# Patient Record
Sex: MALE | Race: WHITE | NOT HISPANIC OR LATINO | URBAN - METROPOLITAN AREA
[De-identification: names, ages, dates, MRNs, and addresses within clinical notes are randomized per-mention and may not be internally consistent; named-entity substitution may affect disease eponyms.]

---

## 2020-06-07 ENCOUNTER — EMERGENCY (EMERGENCY)
Facility: HOSPITAL | Age: 35
LOS: 0 days | Discharge: HOME | End: 2020-06-07
Attending: EMERGENCY MEDICINE | Admitting: EMERGENCY MEDICINE
Payer: COMMERCIAL

## 2020-06-07 VITALS
OXYGEN SATURATION: 97 % | HEART RATE: 99 BPM | TEMPERATURE: 97 F | RESPIRATION RATE: 18 BRPM | SYSTOLIC BLOOD PRESSURE: 176 MMHG | DIASTOLIC BLOOD PRESSURE: 106 MMHG

## 2020-06-07 DIAGNOSIS — N50.819 TESTICULAR PAIN, UNSPECIFIED: ICD-10-CM

## 2020-06-07 DIAGNOSIS — R59.0 LOCALIZED ENLARGED LYMPH NODES: ICD-10-CM

## 2020-06-07 DIAGNOSIS — N50.811 RIGHT TESTICULAR PAIN: ICD-10-CM

## 2020-06-07 DIAGNOSIS — I86.1 SCROTAL VARICES: ICD-10-CM

## 2020-06-07 DIAGNOSIS — N43.3 HYDROCELE, UNSPECIFIED: ICD-10-CM

## 2020-06-07 LAB
APPEARANCE UR: CLEAR — SIGNIFICANT CHANGE UP
BACTERIA # UR AUTO: NEGATIVE — SIGNIFICANT CHANGE UP
BILIRUB UR-MCNC: NEGATIVE — SIGNIFICANT CHANGE UP
COLOR SPEC: YELLOW — SIGNIFICANT CHANGE UP
DIFF PNL FLD: SIGNIFICANT CHANGE UP
EPI CELLS # UR: 2 /HPF — SIGNIFICANT CHANGE UP (ref 0–5)
GLUCOSE UR QL: NEGATIVE — SIGNIFICANT CHANGE UP
HYALINE CASTS # UR AUTO: 11 /LPF — HIGH (ref 0–7)
KETONES UR-MCNC: SIGNIFICANT CHANGE UP
LEUKOCYTE ESTERASE UR-ACNC: ABNORMAL
NITRITE UR-MCNC: NEGATIVE — SIGNIFICANT CHANGE UP
PH UR: 5.5 — SIGNIFICANT CHANGE UP (ref 5–8)
PROT UR-MCNC: ABNORMAL
RBC CASTS # UR COMP ASSIST: 2 /HPF — SIGNIFICANT CHANGE UP (ref 0–4)
SP GR SPEC: 1.03 — HIGH (ref 1.01–1.02)
UROBILINOGEN FLD QL: SIGNIFICANT CHANGE UP
WBC UR QL: 54 /HPF — HIGH (ref 0–5)

## 2020-06-07 PROCEDURE — 99284 EMERGENCY DEPT VISIT MOD MDM: CPT

## 2020-06-07 PROCEDURE — 76870 US EXAM SCROTUM: CPT | Mod: 26

## 2020-06-07 NOTE — ED PROVIDER NOTE - CARE PROVIDER_API CALL
Don Carver  Urology  [address] [place] [address] [address] [place] [id number]  Phone: [phone]  Fax: [phone]  Follow Up Time:

## 2020-06-07 NOTE — ED PROVIDER NOTE - PROGRESS NOTE DETAILS
Discussed results with pt, pt admits sexually active with wife, and offered STD medications, however, does not want them. culture sent off

## 2020-06-07 NOTE — ED PROVIDER NOTE - NS ED ROS FT
Review of Systems:  	•	CONSTITUTIONAL - no fever, no diaphoresis, no chills  	•	SKIN - no rash  	•	EYES - no eye pain, no blurry vision  	•	ENT - no change in hearing, no sore throat, no ear pain or tinnitus  	•	RESPIRATORY - no shortness of breath, no cough  	•	CARDIAC - no chest pain, no palpitations  	•	GI - no abd pain, no nausea, no vomiting, no diarrhea, no constipation  	•	GENITO-URINARY - + right testicular and groin pain  	•	MUSCULOSKELETAL - no joint paint, no swelling, no redness  	•	NEUROLOGIC - no weakness, no headache, no paresthesias, no LOC  	•	PSYCH - no anxiety, non suicidal, non homicidal, no hallucination, no depression

## 2020-06-07 NOTE — ED PROVIDER NOTE - NSFOLLOWUPINSTRUCTIONS_ED_ALL_ED_FT
Lymphadenopathy    Lymphadenopathy means that your lymph glands are swollen or larger than normal (enlarged). Lymph glands, also called lymph nodes, are collections of tissue that filter bacteria, viruses, and waste from your bloodstream. They are part of your body's disease-fighting system (immune system), which protects your body from germs.    There may be different causes of lymphadenopathy, depending on where it is in your body. Some types go away on their own. Lymphadenopathy can occur anywhere that you have lymph glands, including these areas:  Neck (cervical lymphadenopathy).  Chest (mediastinal lymphadenopathy).  Lungs (hilar lymphadenopathy).  Underarms (axillary lymphadenopathy).  Groin (inguinal lymphadenopathy).  When your immune system responds to germs, infection-fighting cells and fluid build up in your lymph glands. This causes some swelling and enlargement. If the lymph glands do not go back to normal after you have an infection or disease, your health care provider may do tests. These tests help to monitor your condition and find the reason why the glands are still swollen and enlarged.    Follow these instructions at home:  Get plenty of rest.  Take over-the-counter and prescription medicines only as told by your health care provider. Your health care provider may recommend over-the-counter medicines for pain.  If directed, apply heat to swollen lymph glands as often as told by your health care provider. Use the heat source that your health care provider recommends, such as a moist heat pack or a heating pad.  Place a towel between your skin and the heat source.   Leave the heat on for 20–30 minutes.   Remove the heat if your skin turns bright red. This is especially important if you are unable to feel pain, heat, or cold. You may have a greater risk of getting burned.  Check your affected lymph glands every day for changes. Check other lymph gland areas as told by your health care provider. Check for changes such as:  More swelling.  Sudden increase in size.  Redness or pain.  Hardness.  Keep all follow-up visits as told by your health care provider. This is important.  Contact a health care provider if you have:  Swelling that gets worse or spreads to other areas.  Problems with breathing.  Lymph glands that:  Are still swollen after 2 weeks.  Have suddenly gotten bigger.  Are red, painful, or hard.  A fever or chills.  Fatigue.  A sore throat.  Pain in your abdomen.  Weight loss.  Night sweats.  Get help right away if you have:  Fluid leaking from an enlarged lymph gland.  Severe pain.  Chest pain.  Shortness of breath.  Summary  Lymphadenopathy means that your lymph glands are swollen or larger than normal (enlarged).  Lymph glands (also called lymph nodes) are collections of tissue that filter bacteria, viruses, and waste from the bloodstream. They are part of your body's disease-fighting system (immune system).  Lymphadenopathy can occur anywhere that you have lymph glands.  If your enlarged and swollen lymph glands do not go back to normal after you have an infection or disease, your health care provider may do tests to monitor your condition and find the reason why the glands are still swollen and enlarged.  Check your affected lymph glands every day for changes. Check other lymph gland areas as told by your health care provider.  This information is not intended to replace advice given to you by your health care provider. Make sure you discuss any questions you have with your health care provider.      Hydrocele    WHAT YOU NEED TO KNOW:    A hydrocele is a collection of fluid inside the scrotum. The scrotum holds the testicles. Hydroceles are simple or communicating. A simple hydrocele stays the same size. A communicating hydrocele gets bigger and smaller as fluid flows into and out of the scrotum.    DISCHARGE INSTRUCTIONS:    Medicines:     Pain medicine:     You may need medicine to take away or decrease pain.   Learn how to take your medicine. Ask what medicine and how much you should take. Be sure you know how, when, and how often to take it.      Do not wait until the pain is severe before you take your medicine. Tell your healthcare provider if your pain does not decrease.      Pain medicine can make you dizzy or sleepy. Prevent falls by calling someone when you get out of bed or if you need help.      Take your medicine as directed. Contact your healthcare provider if you think your medicine is not helping or if you have side effects. Tell him of her if you are allergic to any medicine. Keep a list of the medicines, vitamins, and herbs you take. Include the amounts, and when and why you take them. Bring the list or the pill bottles to follow-up visits. Carry your medicine list with you in case of an emergency.    Follow up with your healthcare provider or urologist as directed: Write down your questions so you remember to ask them during your visits.     Support: You may need to wear a fabric support device similar to a jock strap to decrease swelling.    Contact your healthcare provider or urologist if:     The swelling gets worse or does not go away.      You have questions or concerns about your condition or care.    Return to the emergency department if:     You have severe pain in your scrotum.      You have a fever and your scrotum is red and swollen.        Varicocele    A varicocele is a swelling of veins in the scrotum. The scrotum is the sac that contains the testicles. Varicoceles can occur on either side of the scrotum, but they are more common on the left side. They occur most often in teenage boys and young men.    In most cases, varicoceles are not a serious problem. They are usually small and painless and do not require treatment. Tests may be done to confirm the diagnosis. Treatment may be needed if:    A varicocele is large, causes a lot of pain, or causes pain when exercising.  Varicoceles are found on both sides of the scrotum.  The testicle on the opposite side is absent or not normal.  A varicocele causes a decrease in the size of the testicle in a growing adolescent.  The person has fertility problems.     CAUSES  This condition is the result of valves in the veins not working properly. Valves in the veins help to return blood from the scrotum and testicles to the heart. If these valves do not work well, blood flows backward and backs up into the veins, which causes the veins to swell. This is similar to what happens when varicose veins form in the leg.    SYMPTOMS  Most varicoceles do not cause any symptoms. If symptoms do occur, they may include:    Swelling on one side of the scrotum. The swelling may be more obvious when you are standing up.  A lumpy feeling in the scrotum.  A heavy feeling on one side of the scrotum.  A dull ache in the scrotum, especially after exercise or prolonged standing or sitting.  Slower growth or reduced size of the testicle on the side of the varicocele (in young males).  Problems with fertility. These can occur if the testicle does not grow normally.    DIAGNOSIS  This condition may be diagnosed with a physical exam. You may also have an imaging test, called an ultrasound, to confirm the diagnosis and to help rule out other causes of the swelling.    TREATMENT  Treatment is usually not needed for this condition. If you have any pain, your health care provider may prescribe or recommend medicine to help relieve it. You may need regular exams so your health care provider can monitor the varicocele to ensure that it does not cause problems. When further treatment is needed, it may involve one of these options:    Varicocelectomy. This is a surgery in which the swollen veins are tied off so that the flow of blood goes to other veins instead.  Embolization. In this procedure, a small tube (catheter) is used to place metal coils or other blocking items in the veins. This cuts off the blood flow to the swollen veins.    HOME CARE INSTRUCTIONS  Take medicines only as directed by your health care provider.  Wear supportive underwear.  Use an athletic supporter for sports.  Keep all follow-up visits as directed by your health care provider. This is important.    SEEK MEDICAL CARE IF:  Your pain is increasing.  You have redness in the affected area.  You have swelling that does not decrease when you are lying down.  One of your testicles is smaller than the other.  Your testicle becomes enlarged, swollen, or painful.    ADDITIONAL NOTES AND INSTRUCTIONS    Please follow up with your Primary MD in 24-48 hr.  Seek immediate medical care for any new/worsening signs or symptoms.

## 2020-06-07 NOTE — ED ADULT NURSE NOTE - NSIMPLEMENTINTERV_GEN_ALL_ED
Implemented All Universal Safety Interventions:  [place] to call system. Call bell, personal items and telephone within reach. Instruct patient to call for assistance. Room bathroom lighting operational. Non-slip footwear when patient is off stretcher. Physically safe environment: no spills, clutter or unnecessary equipment. Stretcher in lowest position, wheels locked, appropriate side rails in place.

## 2020-06-07 NOTE — ED PROVIDER NOTE - CARE PLAN
Principal Discharge DX:	Hydrocele  Secondary Diagnosis:	Varicocele  Secondary Diagnosis:	Inguinal lymphadenopathy

## 2020-06-07 NOTE — ED PROVIDER NOTE - PHYSICAL EXAMINATION
CONST: Well appearing in NAD  EYES: PERRL, EOMI, Sclera and conjunctiva clear.   NECK: Non-tender, no meningeal signs. normal ROM. supple   CARD: Normal S1 S2; Normal rate and rhythm  RESP: Equal BS B/L, No wheezes, rhonchi or rales. No distress  GI: Soft, non-tender, non-distended. no cva tenderness. normal BS  : + tenderness to right inguinal region. no swelling or hernia noted. no testicular tenderness. no penile discharge. normal cremasteric reflex  MS: Normal ROM in all extremities. No midline spinal tenderness. pulses 2 +. no calf tenderness or swelling  SKIN: Warm, dry, no acute rashes. Good turgor  NEURO: A&Ox3, No focal deficits. Strength 5/5 with no sensory deficits. Steady gait.

## 2020-06-07 NOTE — ED PROVIDER NOTE - CLINICAL SUMMARY MEDICAL DECISION MAKING FREE TEXT BOX
Patient presented with right inguinal pain. Non-tender on exam. Otherwise afebrile, HD stable, well appearing. No significant  findings except mild LAD. Obtained UA which was negative for infection. Testicular US showed (+) hydrocele and varicocele but no evidence of torsion or any other significant findings. Patient endorsed only sexual activity with his wife, no others. But when offered GC/chlamydia patient accepted, which was collected and sent. Patient declined any azithro/ceftriaxone prophylaxis. Agrees to follow up as outpatient. Agrees to return to ED for any new or worsening symptoms.

## 2020-06-07 NOTE — ED PROVIDER NOTE - ATTENDING CONTRIBUTION TO CARE
34 year old male, no pmhx, presenting with 3 days of right testicle and groin pain described as achy, non-radiating, no palliative or provocative factors, mild severity. Denies having this before, and denies sexual activity with anyone other than his wife. Otherwise denies fevers, penile discharge, urinary symptoms, N/V, flank pain or any other complaints.    Vital Signs: I have reviewed the initial vital signs.  Constitutional: NAD, well-nourished, appears stated age, no acute distress.  HEENT: Airway patent, moist MM, no erythema/swelling/deformity of oral structures. EOMI, PERRLA.  CV: regular rate, regular rhythm, well-perfused extremities, 2+ b/l DP and radial pulses equal.  Lungs: BCTA, no increased WOB.  ABD: NTND, no guarding or rebound, no pulsatile mass, no hernias.   : No testicular tenderness, no masses, bilateral cremasteric reflexes intact, normal testicular lie, no penile lesions or discharge noted. (+) right inguinal LAD noted  MSK: Neck supple, nontender, nl ROM, no stepoff. Chest nontender. Back nontender in TLS spine or to b/l bony structures or flanks. Ext nontender, nl rom, no deformity.   INTEG: Skin warm, dry, no rash.  NEURO: A&Ox3, normal strength, nl sensation throughout, normal speech.   PSYCH: Calm, cooperative, normal affect and interaction.    Will obtain UA, Urine cx, testicular US, pain control, re-eval.

## 2020-06-07 NOTE — ED PROVIDER NOTE - PATIENT PORTAL LINK FT
You can access the FollowMyHealth Patient Portal offered by [facility] by registering at the following website: [email or web address]. By joining [facility]’s FollowMyHealth portal, you will also be able to view your health information using other applications (apps) compatible with our system.

## 2020-06-07 NOTE — ED PROVIDER NOTE - OBJECTIVE STATEMENT
34 year old male with no pmhx presents with right testicle and groin pain x 3 days. pain worse with movement. no testicle swelling, redness, or penile discharge, urinary symptoms, fever, chills, abd pain or flank pain.

## 2020-06-08 LAB
C TRACH RRNA SPEC QL NAA+PROBE: DETECTED
CULTURE RESULTS: NO GROWTH — SIGNIFICANT CHANGE UP
N GONORRHOEA RRNA SPEC QL NAA+PROBE: SIGNIFICANT CHANGE UP
SPECIMEN SOURCE: SIGNIFICANT CHANGE UP
SPECIMEN SOURCE: SIGNIFICANT CHANGE UP

## 2020-06-09 NOTE — ED POST DISCHARGE NOTE - DETAILS
FED-EX SENT TO HOME PATIENT CALLED AFTER GETTING FED-EX TODAY. DISCUSSED FINDINGS WITH HIM. TOLD HIM TO CALL PMD FOR FURTHER EVALUATION. AZITHROMYCIN 1 GRAM DOSE SENT TO PHARMACY.

## 2020-06-12 RX ORDER — AZITHROMYCIN 500 MG/1
2 TABLET, FILM COATED ORAL
Qty: 2 | Refills: 0
Start: 2020-06-12 | End: 2020-06-12

## 2024-04-23 ENCOUNTER — EMERGENCY (EMERGENCY)
Facility: HOSPITAL | Age: 39
LOS: 0 days | Discharge: HOME | End: 2024-04-23
Attending: EMERGENCY MEDICINE
Payer: COMMERCIAL

## 2024-04-23 VITALS
SYSTOLIC BLOOD PRESSURE: 176 MMHG | HEART RATE: 102 BPM | TEMPERATURE: 99 F | OXYGEN SATURATION: 98 % | WEIGHT: 220.02 LBS | HEIGHT: 69 IN | DIASTOLIC BLOOD PRESSURE: 109 MMHG | RESPIRATION RATE: 17 BRPM

## 2024-04-23 DIAGNOSIS — I10 ESSENTIAL (PRIMARY) HYPERTENSION: ICD-10-CM

## 2024-04-23 DIAGNOSIS — E78.5 HYPERLIPIDEMIA, UNSPECIFIED: ICD-10-CM

## 2024-04-23 DIAGNOSIS — N43.3 HYDROCELE, UNSPECIFIED: ICD-10-CM

## 2024-04-23 DIAGNOSIS — N50.89 OTHER SPECIFIED DISORDERS OF THE MALE GENITAL ORGANS: ICD-10-CM

## 2024-04-23 LAB
APPEARANCE UR: CLEAR — SIGNIFICANT CHANGE UP
BILIRUB UR-MCNC: NEGATIVE — SIGNIFICANT CHANGE UP
COLOR SPEC: YELLOW — SIGNIFICANT CHANGE UP
DIFF PNL FLD: NEGATIVE — SIGNIFICANT CHANGE UP
GLUCOSE UR QL: 500 MG/DL
HIV 1 & 2 AB SERPL IA.RAPID: SIGNIFICANT CHANGE UP
KETONES UR-MCNC: ABNORMAL MG/DL
LEUKOCYTE ESTERASE UR-ACNC: NEGATIVE — SIGNIFICANT CHANGE UP
NITRITE UR-MCNC: NEGATIVE — SIGNIFICANT CHANGE UP
PH UR: 6 — SIGNIFICANT CHANGE UP (ref 5–8)
PROT UR-MCNC: NEGATIVE MG/DL — SIGNIFICANT CHANGE UP
SP GR SPEC: 1.03 — SIGNIFICANT CHANGE UP (ref 1–1.03)
UROBILINOGEN FLD QL: 1 MG/DL — SIGNIFICANT CHANGE UP (ref 0.2–1)

## 2024-04-23 PROCEDURE — 99284 EMERGENCY DEPT VISIT MOD MDM: CPT | Mod: 25

## 2024-04-23 PROCEDURE — 87591 N.GONORRHOEAE DNA AMP PROB: CPT

## 2024-04-23 PROCEDURE — 87491 CHLMYD TRACH DNA AMP PROBE: CPT

## 2024-04-23 PROCEDURE — 76870 US EXAM SCROTUM: CPT

## 2024-04-23 PROCEDURE — 36415 COLL VENOUS BLD VENIPUNCTURE: CPT

## 2024-04-23 PROCEDURE — 99284 EMERGENCY DEPT VISIT MOD MDM: CPT

## 2024-04-23 PROCEDURE — 87661 TRICHOMONAS VAGINALIS AMPLIF: CPT

## 2024-04-23 PROCEDURE — 86703 HIV-1/HIV-2 1 RESULT ANTBDY: CPT

## 2024-04-23 PROCEDURE — 76870 US EXAM SCROTUM: CPT | Mod: 26

## 2024-04-23 PROCEDURE — 81003 URINALYSIS AUTO W/O SCOPE: CPT

## 2024-04-23 PROCEDURE — 86803 HEPATITIS C AB TEST: CPT

## 2024-04-23 NOTE — ED PROVIDER NOTE - OBJECTIVE STATEMENT
38-year-old male with past medical history of HTN and HLD presents to the ED complaining of pain and swelling to his left testicle that has been intermittent over the last 6 months.  Over the last few days patient noted that the swelling has gotten worse prompting ED eval.  He has not taken any medication to improve his symptoms.  He denies concern for STIs.  He denies other complaints. Pt denies fever, chills, nausea, vomiting, abdominal pain, diarrhea, headache, dizziness, weakness, chest pain, SOB, back pain, LOC, trauma, urinary symptoms, penile discharge, cough.

## 2024-04-23 NOTE — ED ADULT NURSE NOTE - CAS TRG GENERAL AIRWAY, MLM

## 2024-04-23 NOTE — ED PROVIDER NOTE - NSFOLLOWUPINSTRUCTIONS_ED_ALL_ED_FT
Our Emergency Department Referral Coordinators will be reaching out to you in the next 24-48 hours from 9:00am to to schedule a follow up appointment. Please expect a phone call from the hospital in that time frame. If you do not receive a call or if you have any questions or concerns, you can reach them at [phone].    Hydrocele, Adult  ImageA hydrocele is a collection of fluid in the loose pouch of skin that holds the testicles (scrotum). This may happen because:  The amount of fluid produced in the scrotum is not absorbed by the rest of the body.  Fluid from the abdomen fills the scrotum. Normally, the testicles develop in the abdomen then move (drop) into to the scrotum before birth. The tube that the testicles travel through usually closes after the testicles drop. If the tube does not close, fluid from the abdomen can fill the scrotum. This is less common in adults.  What are the causes?  The cause of a hydrocele in adults is usually not known. However, it may be caused by:  An injury to the scrotum.  An infection (epididymitis).  Decreased blood flow to the scrotum.  Twisting of a testicle (testicular torsion).  A birth defect.  A tumor or cancer of the testicle.  What are the signs or symptoms?  A hydrocele feels like a water-filled balloon. It may also feel heavy. Other symptoms include:  Swelling of the scrotum. The swelling may decrease when you lie down. You may also notice more swelling at night than in the morning.  Swelling of the groin.  Mild discomfort in the scrotum.  Pain. This can develop if the hydrocele was caused by infection or twisting. The larger the hydrocele, the more likely you are to have pain.  How is this diagnosed?  This condition may be diagnosed based on:  Physical exam.  Medical history.  You may also have other tests, including:  Imaging tests, such as ultrasound.  Blood or urine tests.  How is this treated?  Most hydroceles go away on their own. If you have no discomfort or pain, your health care provider may suggest close monitoring of your condition (called watch and wait or watchful waiting) until the condition goes away or symptoms develop. If treatment is needed, it may include:  Treating an underlying condition. This may include using an antibiotic medicine to treat an infection.  Surgery to stop fluid from collecting in the scrotum.  Surgery to drain the fluid. Options include:  Needle aspiration. A needle is used to drain fluid. However, the fluid buildup will come back quickly.  Hydrocelectomy. For this procedure, an incision is made in the scrotum to remove the fluid sac.  Follow these instructions at home:  Watch the hydrocele for any changes.  Take over-the-counter and prescription medicines only as told by your health care provider.  If you were prescribed an antibiotic medicine, use it as told by your health care provider. Do not stop taking the antibiotic even if you start to feel better.  Keep all follow-up visits as told by your health care provider. This is important.  Contact a health care provider if:  You notice any changes in the hydrocele.  The swelling in your scrotum or groin gets worse.  The hydrocele becomes red, firm, painful, or tender to the touch.  You have a fever.  Get help right away if you:  Develop a lot of pain, or your pain becomes worse.  Summary  A hydrocele is a collection of fluid in the loose pouch of skin that holds the testicles (scrotum).  Hydroceles can cause swelling, discomfort, and sometimes pain.  In adults, the cause of a hydrocele usually is not known. However, it is sometimes caused by an infection or a rotation and twisting of the scrotum.  Treatment is usually not needed. Hydroceles often go away on their own. If a hydrocele causes pain, treatment may be given to ease the pain.  This information is not intended to replace advice given to you by your health care provider. Make sure you discuss any questions you have with your health care provider.

## 2024-04-23 NOTE — ED ADULT TRIAGE NOTE - CHIEF COMPLAINT QUOTE
pt presented to ED c/o L testicle swelling and L flank pain. denies any hematuria or dysuria at this time

## 2024-04-23 NOTE — ED PROVIDER NOTE - PHYSICAL EXAMINATION
VITAL SIGNS: I have reviewed nursing notes and confirm.  CONSTITUTIONAL: Well-developed; well-nourished; in no acute distress.  SKIN: Skin exam is warm and dry, no acute rash.  HEAD: Normocephalic; atraumatic.  EYES: Conjunctiva and sclera clear.  ENT: No nasal discharge; airway clear.   CARD: Regular rate and rhythm.  RESP: Speaking in full sentences.   ABD: Normal bowel sounds; soft; non-distended; non-tender; No rebound or guarding. No CVA tenderness.  :Normal external genitalia, no rashes or sores.  Mild TTP and swelling to left testicle.  No masses noted.  No skin changes.  Normal cremasteric reflex B/L.  Chaperoned by Dr. Trimble.   EXT: Normal ROM. No clubbing, cyanosis or edema.  NEURO: Alert, oriented. Grossly unremarkable. No focal deficits.

## 2024-04-23 NOTE — ED PROVIDER NOTE - CLINICAL SUMMARY MEDICAL DECISION MAKING FREE TEXT BOX
38-year-old male presents to the ED for testicular swelling.  Prior history of similar swelling.  Concerned due to mild pain.  No dysuria noted.  Was concerned there is prior history of cancer in the family.  Physical exam with mild left testicular swelling with mild tenderness to palpation.  No palpable cord noted.  No discharge expressed from penis.  Ultrasound noted to have hydrocele.  UA negative for leukocyte esterase and nitrites.  Will follow-up with urology.  Discharged home.  Currently no acute intervention.  Results conveyed to patient.  Patient understood plan.

## 2024-04-23 NOTE — ED PROVIDER NOTE - PATIENT PORTAL LINK FT
You can access the FollowMyHealth Patient Portal offered by [facility] by registering at the following website: [email or web address]. By joining [facility]’s FollowMyHealth portal, you will also be able to view your health information using other applications (apps) compatible with our system.

## 2024-04-24 PROBLEM — Z78.9 OTHER SPECIFIED HEALTH STATUS: Chronic | Status: ACTIVE | Noted: 2020-06-14

## 2024-04-24 LAB
C TRACH RRNA SPEC QL NAA+PROBE: SIGNIFICANT CHANGE UP
HCV AB S/CO SERPL IA: 0.05 COI — SIGNIFICANT CHANGE UP
HCV AB SERPL-IMP: SIGNIFICANT CHANGE UP
N GONORRHOEA RRNA SPEC QL NAA+PROBE: SIGNIFICANT CHANGE UP
SPECIMEN SOURCE: SIGNIFICANT CHANGE UP
SPECIMEN SOURCE: SIGNIFICANT CHANGE UP
T VAGINALIS RRNA SPEC QL NAA+PROBE: SIGNIFICANT CHANGE UP

## 2024-09-12 ENCOUNTER — APPOINTMENT (OUTPATIENT)
Dept: UROLOGY | Facility: CLINIC | Age: 39
End: 2024-09-12